# Patient Record
Sex: FEMALE | Race: ASIAN | NOT HISPANIC OR LATINO | Employment: FULL TIME
[De-identification: names, ages, dates, MRNs, and addresses within clinical notes are randomized per-mention and may not be internally consistent; named-entity substitution may affect disease eponyms.]

---

## 2017-05-30 ENCOUNTER — RECORDS - HEALTHEAST (OUTPATIENT)
Dept: ADMINISTRATIVE | Facility: OTHER | Age: 22
End: 2017-05-30

## 2017-05-30 LAB
BKR LAB AP ABNORMAL BLEEDING: NO
BKR LAB AP BIRTH CONTROL/HORMONES: NORMAL
BKR LAB AP CERVICAL APPEARANCE: NORMAL
BKR LAB AP GYN ADEQUACY: NORMAL
BKR LAB AP GYN INTERPRETATION: NORMAL
BKR LAB AP HPV REFLEX: NO
BKR LAB AP LMP: NORMAL
BKR LAB AP PATIENT STATUS: NORMAL
BKR LAB AP PREVIOUS ABNORMAL: NORMAL
BKR LAB AP PREVIOUS NORMAL: 2016
HIGH RISK?: NO
PATH REPORT.COMMENTS IMP SPEC: NORMAL
RESULT FLAG (HE HISTORICAL CONVERSION): NORMAL

## 2019-03-13 ENCOUNTER — RECORDS - HEALTHEAST (OUTPATIENT)
Dept: LAB | Facility: CLINIC | Age: 24
End: 2019-03-13

## 2019-03-13 LAB — TSH SERPL DL<=0.005 MIU/L-ACNC: 3.92 UIU/ML (ref 0.3–5)

## 2019-03-18 ENCOUNTER — RECORDS - HEALTHEAST (OUTPATIENT)
Dept: LAB | Facility: CLINIC | Age: 24
End: 2019-03-18

## 2019-03-18 LAB — PROLACTIN SERPL-MCNC: 20.3 NG/ML (ref 0–20)

## 2021-07-22 ENCOUNTER — LAB REQUISITION (OUTPATIENT)
Dept: LAB | Facility: CLINIC | Age: 26
End: 2021-07-22
Payer: COMMERCIAL

## 2021-07-22 DIAGNOSIS — Z01.419 ENCOUNTER FOR GYNECOLOGICAL EXAMINATION (GENERAL) (ROUTINE) WITHOUT ABNORMAL FINDINGS: ICD-10-CM

## 2021-07-22 DIAGNOSIS — N91.2 AMENORRHEA, UNSPECIFIED: ICD-10-CM

## 2021-07-22 DIAGNOSIS — R35.0 FREQUENCY OF MICTURITION: ICD-10-CM

## 2021-07-22 LAB
PROLACTIN SERPL-MCNC: 12.8 NG/ML (ref 0–20)
TSH SERPL DL<=0.005 MIU/L-ACNC: 3.7 UIU/ML (ref 0.3–5)

## 2021-07-22 PROCEDURE — 87624 HPV HI-RISK TYP POOLED RSLT: CPT | Mod: ORL | Performed by: FAMILY MEDICINE

## 2021-07-22 PROCEDURE — 87086 URINE CULTURE/COLONY COUNT: CPT | Performed by: FAMILY MEDICINE

## 2021-07-22 PROCEDURE — G0123 SCREEN CERV/VAG THIN LAYER: HCPCS | Mod: ORL | Performed by: FAMILY MEDICINE

## 2021-07-22 PROCEDURE — 84146 ASSAY OF PROLACTIN: CPT | Performed by: FAMILY MEDICINE

## 2021-07-22 PROCEDURE — 84443 ASSAY THYROID STIM HORMONE: CPT | Performed by: FAMILY MEDICINE

## 2021-07-23 LAB — BACTERIA UR CULT: NO GROWTH

## 2021-07-27 LAB
HUMAN PAPILLOMA VIRUS 16 DNA: NEGATIVE
HUMAN PAPILLOMA VIRUS 18 DNA: NEGATIVE
HUMAN PAPILLOMA VIRUS FINAL DIAGNOSIS: NORMAL
HUMAN PAPILLOMA VIRUS OTHER HR: NEGATIVE

## 2021-07-30 LAB
BKR LAB AP GYN ADEQUACY: NORMAL
BKR LAB AP GYN INTERPRETATION: NORMAL
BKR LAB AP HPV REFLEX: NORMAL
BKR LAB AP LMP: NORMAL
BKR LAB AP PREVIOUS ABNL DX: NORMAL
BKR LAB AP PREVIOUS ABNORMAL: NORMAL
PATH REPORT.COMMENTS IMP SPEC: NORMAL
PATH REPORT.RELEVANT HX SPEC: NORMAL

## 2023-01-17 ENCOUNTER — HOSPITAL ENCOUNTER (EMERGENCY)
Facility: HOSPITAL | Age: 28
Discharge: HOME | End: 2023-01-17
Attending: EMERGENCY MEDICINE | Admitting: EMERGENCY MEDICINE
Payer: COMMERCIAL

## 2023-01-17 VITALS
TEMPERATURE: 97.8 F | WEIGHT: 198 LBS | BODY MASS INDEX: 33.8 KG/M2 | SYSTOLIC BLOOD PRESSURE: 140 MMHG | DIASTOLIC BLOOD PRESSURE: 104 MMHG | HEART RATE: 90 BPM | RESPIRATION RATE: 16 BRPM | OXYGEN SATURATION: 98 % | HEIGHT: 64 IN

## 2023-01-17 DIAGNOSIS — N30.01 ACUTE CYSTITIS WITH HEMATURIA: ICD-10-CM

## 2023-01-17 LAB
ALBUMIN UR-MCNC: 100 MG/DL
APPEARANCE UR: ABNORMAL
BACTERIA #/AREA URNS HPF: ABNORMAL /HPF
BILIRUB UR QL STRIP: NEGATIVE
COLOR UR AUTO: ABNORMAL
GLUCOSE UR STRIP-MCNC: NEGATIVE MG/DL
HGB UR QL STRIP: ABNORMAL
KETONES UR STRIP-MCNC: NEGATIVE MG/DL
LEUKOCYTE ESTERASE UR QL STRIP: ABNORMAL
NITRATE UR QL: NEGATIVE
PH UR STRIP: 6.5 [PH] (ref 5–7)
RBC URINE: 21 /HPF
SP GR UR STRIP: 1 (ref 1–1.03)
SQUAMOUS EPITHELIAL: 2 /HPF
UROBILINOGEN UR STRIP-MCNC: <2 MG/DL
WBC URINE: 147 /HPF

## 2023-01-17 PROCEDURE — 87086 URINE CULTURE/COLONY COUNT: CPT | Performed by: EMERGENCY MEDICINE

## 2023-01-17 PROCEDURE — 81001 URINALYSIS AUTO W/SCOPE: CPT | Performed by: STUDENT IN AN ORGANIZED HEALTH CARE EDUCATION/TRAINING PROGRAM

## 2023-01-17 PROCEDURE — 99283 EMERGENCY DEPT VISIT LOW MDM: CPT

## 2023-01-17 PROCEDURE — 81001 URINALYSIS AUTO W/SCOPE: CPT | Performed by: EMERGENCY MEDICINE

## 2023-01-17 RX ORDER — PHENAZOPYRIDINE HYDROCHLORIDE 200 MG/1
200 TABLET, FILM COATED ORAL 3 TIMES DAILY
Qty: 9 TABLET | Refills: 0 | Status: SHIPPED | OUTPATIENT
Start: 2023-01-17 | End: 2023-01-20

## 2023-01-17 RX ORDER — CEFDINIR 300 MG/1
300 CAPSULE ORAL 2 TIMES DAILY
Qty: 14 CAPSULE | Refills: 0 | Status: SHIPPED | OUTPATIENT
Start: 2023-01-17 | End: 2023-01-24

## 2023-01-17 NOTE — ED TRIAGE NOTES
Patient noticed blood in her urine starting this am--now is having painful urination--no flank pain.

## 2023-01-17 NOTE — ED PROVIDER NOTES
EMERGENCY DEPARTMENT ENCOUNTER      NAME: Rafat Rodrigues  AGE: 27 year old female  YOB: 1995  MRN: [id number]  EVALUATION DATE & TIME: No admission date for patient encounter.    PCP: No Ref-Primary, Physician    ED PROVIDER: Constanza Jennings MD    Chief Complaint   Patient presents with     Hematuria         FINAL IMPRESSION:  1. Acute cystitis with hematuria          ED COURSE & MEDICAL DECISION MAKING:    Pertinent Labs & Imaging studies reviewed. (See chart for details)  27 year old female otherwise healthy who presents to the Emergency Department for evaluation of 1 day of small-volume urinary frequency, urgency, dysuria and hematuria.  Exam is unremarkable without any abdominal tenderness, CVA tenderness and she does not have any fevers chills or systemic symptoms.  Overall favor simple cystitis.  Clinically no evidence of pyelonephritis, lateralizing symptoms to suspect a ureterolithiasis.  Urinalysis with leukocyte Estrace, white cells, bacteria.  Will treat with cefdinir, Pyridium for home.  Warning signs return to ED discussed.      ED Course as of 01/17/23 1247   Tue Jan 17, 2023   1215 UA with Microscopic reflex to Culture(!)  +UTI   1217 I met with the patient to gather history and to perform my initial exam. We discussed plans for the ED course, including diagnostic testing and treatment.      1232 I marked the patient ready for discharge.        Medical Decision Making    History:    Supplemental history from: Documented in chart, if applicable    External Record(s) reviewed: Documented in chart, if applicable.    Work Up:    Chart documentation includes differential considered and any EKGs or imaging independently interpreted by provider, where specified.    In additional to work up documented, I considered the following work up: Documented in chart, if applicable.    External consultation:    Discussion of management with another provider: Documented in chart, if applicable    Complicating  factors:    Care impacted by chronic illness: N/A    Care affected by social determinants of health: Access to Medical Care    Disposition considerations: Discharge. I prescribed additional prescription strength medication(s) as charted. N/A.        At the conclusion of the encounter I discussed the results of all of the tests and the disposition. The questions were answered. The patient or family acknowledged understanding and was agreeable with the care plan.      MEDICATIONS GIVEN IN THE EMERGENCY:  Medications - No data to display    NEW PRESCRIPTIONS STARTED AT TODAY'S ER VISIT  New Prescriptions    CEFDINIR (OMNICEF) 300 MG CAPSULE    Take 1 capsule (300 mg) by mouth 2 times daily for 7 days    PHENAZOPYRIDINE (PYRIDIUM) 200 MG TABLET    Take 1 tablet (200 mg) by mouth 3 times daily for 3 days          =================================================================    HPI    Patient information was obtained from: Patient     Use of Intrepreter: N/A        Rafat Rodrigues is a 27 year old female with a limited pertinent medical history who presents with hematuria.     Patient reports this morning she developed hematuria and dysuria. Notes her hematuria started as light pink but has progressively become darker in color. Endorses chills.     Denies back pain, flank pain, abdominal pain, fever, nausea, vomiting.       REVIEW OF SYSTEMS  Constitutional:  Denies fever, chills, weight loss or weakness  GI:  Denies abdominal pain, nausea, vomiting, diarrhea  : Endorses dysuria, hematuria. Denies flank pain.   Musculoskeletal:  Denies any new muscle/joint pain, swelling or loss of function.      PAST MEDICAL HISTORY:  History reviewed. No pertinent past medical history.    PAST SURGICAL HISTORY:  History reviewed. No pertinent surgical history.    CURRENT MEDICATIONS:    Cannot display prior to admission medications because the patient has not been admitted in this contact.       ALLERGIES:  No Known Allergies    FAMILY  "HISTORY:  History reviewed. No pertinent family history.    SOCIAL HISTORY:  Social History     Tobacco Use     Smoking status: Never     Smokeless tobacco: Never   Substance Use Topics     Alcohol use: No     Drug use: No        VITALS:  Patient Vitals for the past 24 hrs:   BP Temp Temp src Pulse Resp SpO2 Height Weight   01/17/23 1155 (!) 140/104 97.8  F (36.6  C) Oral 90 16 98 % 1.626 m (5' 4\") 89.8 kg (198 lb)       PHYSICAL EXAM    General Appearance: Well-appearing, well-nourished, no acute distress   Head:  Normocephalic  Eyes:  conjunctiva/corneas clear  ENT:  membranes are moist without pallor  Cardio:  Regular rate and rhythm  Pulm:  No respiratory distress  Back:   No CVA tenderness, normal ROM  Abdomen:  Soft, non-tender, non distended,no rebound or guarding.  Extremities: Normal gait  Skin:  Skin warm, dry, no rashes  Neuro:  Alert and oriented ×3     RADIOLOGY/LABS:  Reviewed all pertinent imaging. Please see official radiology report. All pertinent labs reviewed and interpreted.    Results for orders placed or performed during the hospital encounter of 01/17/23   UA with Microscopic reflex to Culture    Specimen: Urine, Clean Catch   Result Value Ref Range    Color Urine Red (A) Colorless, Straw, Light Yellow, Yellow    Appearance Urine Turbid (A) Clear    Glucose Urine Negative Negative mg/dL    Bilirubin Urine Negative Negative    Ketones Urine Negative Negative mg/dL    Specific Gravity Urine 1.003 1.001 - 1.030    Blood Urine >1.0 mg/dL (A) Negative    pH Urine 6.5 5.0 - 7.0    Protein Albumin Urine 100 (A) Negative mg/dL    Urobilinogen Urine <2.0 <2.0 mg/dL    Nitrite Urine Negative Negative    Leukocyte Esterase Urine 500 Brenda/uL (A) Negative    Bacteria Urine Few (A) None Seen /HPF    RBC Urine 21 (H) <=2 /HPF    WBC Urine 147 (H) <=5 /HPF    Squamous Epithelials Urine 2 (H) <=1 /HPF       The creation of this record is based on the scribe s observations of the work being performed by Constanza" MD Dick and the provider s statements to them. It was created on her behalf by Linda Grijalva, a trained medical scribe. This document has been checked and approved by the attending provider.    Constanza Jennings MD  Emergency Medicine  [facility] EMERGENCY DEPARTMENT  [address] [place] [id number]  [phone]  Dept: [phone]       Constanza Jennings MD  01/17/23 1241

## 2023-01-19 LAB — BACTERIA UR CULT: NORMAL

## 2024-07-14 PROCEDURE — 99285 EMERGENCY DEPT VISIT HI MDM: CPT | Mod: 25

## 2024-07-14 ASSESSMENT — COLUMBIA-SUICIDE SEVERITY RATING SCALE - C-SSRS
6. HAVE YOU EVER DONE ANYTHING, STARTED TO DO ANYTHING, OR PREPARED TO DO ANYTHING TO END YOUR LIFE?: NO
2. HAVE YOU ACTUALLY HAD ANY THOUGHTS OF KILLING YOURSELF IN THE PAST MONTH?: NO
1. IN THE PAST MONTH, HAVE YOU WISHED YOU WERE DEAD OR WISHED YOU COULD GO TO SLEEP AND NOT WAKE UP?: NO

## 2024-07-15 ENCOUNTER — ANESTHESIA EVENT (OUTPATIENT)
Dept: SURGERY | Facility: CLINIC | Age: 29
End: 2024-07-15
Payer: COMMERCIAL

## 2024-07-15 ENCOUNTER — ANESTHESIA (OUTPATIENT)
Dept: SURGERY | Facility: CLINIC | Age: 29
End: 2024-07-15
Payer: COMMERCIAL

## 2024-07-15 ENCOUNTER — HOSPITAL ENCOUNTER (OUTPATIENT)
Facility: CLINIC | Age: 29
Setting detail: OBSERVATION
Discharge: HOME | End: 2024-07-15
Attending: EMERGENCY MEDICINE | Admitting: FAMILY MEDICINE
Payer: COMMERCIAL

## 2024-07-15 VITALS
TEMPERATURE: 97.3 F | DIASTOLIC BLOOD PRESSURE: 67 MMHG | WEIGHT: 189 LBS | BODY MASS INDEX: 32.44 KG/M2 | SYSTOLIC BLOOD PRESSURE: 119 MMHG | HEART RATE: 92 BPM | RESPIRATION RATE: 13 BRPM | OXYGEN SATURATION: 95 %

## 2024-07-15 DIAGNOSIS — N30.01 ACUTE CYSTITIS WITH HEMATURIA: ICD-10-CM

## 2024-07-15 DIAGNOSIS — K35.30 ACUTE APPENDICITIS WITH LOCALIZED PERITONITIS, WITHOUT PERFORATION, ABSCESS, OR GANGRENE: ICD-10-CM

## 2024-07-15 DIAGNOSIS — G89.18 ACUTE POST-OPERATIVE PAIN: ICD-10-CM

## 2024-07-15 DIAGNOSIS — K35.200 ACUTE APPENDICITIS WITH GENERALIZED PERITONITIS WITHOUT GANGRENE, PERFORATION, OR ABSCESS: Primary | ICD-10-CM

## 2024-07-15 LAB
ANION GAP SERPL CALCULATED.3IONS-SCNC: 8 MMOL/L (ref 7–15)
BUN SERPL-MCNC: 7.7 MG/DL (ref 6–20)
CALCIUM SERPL-MCNC: 8.4 MG/DL (ref 8.6–10)
CHLORIDE SERPL-SCNC: 107 MMOL/L (ref 98–107)
CREAT SERPL-MCNC: 0.62 MG/DL (ref 0.51–0.95)
DEPRECATED HCO3 PLAS-SCNC: 23 MMOL/L (ref 22–29)
EGFRCR SERPLBLD CKD-EPI 2021: >90 ML/MIN/1.73M2
ERYTHROCYTE [DISTWIDTH] IN BLOOD BY AUTOMATED COUNT: 15 % (ref 10–15)
GLUCOSE SERPL-MCNC: 98 MG/DL (ref 70–99)
HCT VFR BLD AUTO: 33.7 % (ref 35–47)
HGB BLD-MCNC: 10.8 G/DL (ref 11.7–15.7)
MCH RBC QN AUTO: 25.7 PG (ref 26.5–33)
MCHC RBC AUTO-ENTMCNC: 32 G/DL (ref 31.5–36.5)
MCV RBC AUTO: 80 FL (ref 78–100)
PLATELET # BLD AUTO: 241 10E3/UL (ref 150–450)
POTASSIUM SERPL-SCNC: 3.8 MMOL/L (ref 3.4–5.3)
RBC # BLD AUTO: 4.21 10E6/UL (ref 3.8–5.2)
SODIUM SERPL-SCNC: 138 MMOL/L (ref 135–145)
WBC # BLD AUTO: 12.6 10E3/UL (ref 4–11)

## 2024-07-15 PROCEDURE — 999N000141 HC STATISTIC PRE-PROCEDURE NURSING ASSESSMENT: Performed by: SURGERY

## 2024-07-15 PROCEDURE — G0378 HOSPITAL OBSERVATION PER HR: HCPCS

## 2024-07-15 PROCEDURE — 88304 TISSUE EXAM BY PATHOLOGIST: CPT | Mod: TC | Performed by: SURGERY

## 2024-07-15 PROCEDURE — 250N000025 HC SEVOFLURANE, PER MIN: Performed by: SURGERY

## 2024-07-15 PROCEDURE — 88304 TISSUE EXAM BY PATHOLOGIST: CPT | Mod: 26 | Performed by: PATHOLOGY

## 2024-07-15 PROCEDURE — 44970 LAPAROSCOPY APPENDECTOMY: CPT | Performed by: SURGERY

## 2024-07-15 PROCEDURE — 36415 COLL VENOUS BLD VENIPUNCTURE: CPT

## 2024-07-15 PROCEDURE — 258N000003 HC RX IP 258 OP 636: Performed by: EMERGENCY MEDICINE

## 2024-07-15 PROCEDURE — 99222 1ST HOSP IP/OBS MODERATE 55: CPT | Mod: 57

## 2024-07-15 PROCEDURE — 85027 COMPLETE CBC AUTOMATED: CPT

## 2024-07-15 PROCEDURE — 250N000009 HC RX 250: Performed by: ANESTHESIOLOGY

## 2024-07-15 PROCEDURE — 250N000011 HC RX IP 250 OP 636: Performed by: EMERGENCY MEDICINE

## 2024-07-15 PROCEDURE — 250N000009 HC RX 250: Performed by: SURGERY

## 2024-07-15 PROCEDURE — 250N000013 HC RX MED GY IP 250 OP 250 PS 637: Performed by: SURGERY

## 2024-07-15 PROCEDURE — 258N000003 HC RX IP 258 OP 636: Performed by: NURSE ANESTHETIST, CERTIFIED REGISTERED

## 2024-07-15 PROCEDURE — 99222 1ST HOSP IP/OBS MODERATE 55: CPT | Mod: FS | Performed by: SURGERY

## 2024-07-15 PROCEDURE — 272N000001 HC OR GENERAL SUPPLY STERILE: Performed by: SURGERY

## 2024-07-15 PROCEDURE — 250N000009 HC RX 250: Performed by: NURSE ANESTHETIST, CERTIFIED REGISTERED

## 2024-07-15 PROCEDURE — 96366 THER/PROPH/DIAG IV INF ADDON: CPT

## 2024-07-15 PROCEDURE — 250N000011 HC RX IP 250 OP 636: Performed by: ANESTHESIOLOGY

## 2024-07-15 PROCEDURE — 710N000010 HC RECOVERY PHASE 1, LEVEL 2, PER MIN: Performed by: SURGERY

## 2024-07-15 PROCEDURE — 370N000017 HC ANESTHESIA TECHNICAL FEE, PER MIN: Performed by: SURGERY

## 2024-07-15 PROCEDURE — 250N000011 HC RX IP 250 OP 636

## 2024-07-15 PROCEDURE — 96365 THER/PROPH/DIAG IV INF INIT: CPT | Mod: 59

## 2024-07-15 PROCEDURE — 96375 TX/PRO/DX INJ NEW DRUG ADDON: CPT

## 2024-07-15 PROCEDURE — 250N000011 HC RX IP 250 OP 636: Performed by: SURGERY

## 2024-07-15 PROCEDURE — 44970 LAPAROSCOPY APPENDECTOMY: CPT | Mod: AS

## 2024-07-15 PROCEDURE — 360N000076 HC SURGERY LEVEL 3, PER MIN: Performed by: SURGERY

## 2024-07-15 PROCEDURE — 99222 1ST HOSP IP/OBS MODERATE 55: CPT | Mod: GC

## 2024-07-15 PROCEDURE — 710N000012 HC RECOVERY PHASE 2, PER MINUTE: Performed by: SURGERY

## 2024-07-15 PROCEDURE — 80048 BASIC METABOLIC PNL TOTAL CA: CPT

## 2024-07-15 PROCEDURE — 258N000003 HC RX IP 258 OP 636: Performed by: ANESTHESIOLOGY

## 2024-07-15 PROCEDURE — 250N000011 HC RX IP 250 OP 636: Performed by: NURSE ANESTHETIST, CERTIFIED REGISTERED

## 2024-07-15 RX ORDER — FENTANYL CITRATE 50 UG/ML
25-100 INJECTION, SOLUTION INTRAMUSCULAR; INTRAVENOUS
Status: DISCONTINUED | OUTPATIENT
Start: 2024-07-15 | End: 2024-07-15 | Stop reason: HOSPADM

## 2024-07-15 RX ORDER — ACETAMINOPHEN 325 MG/1
650 TABLET ORAL EVERY 4 HOURS PRN
Status: DISCONTINUED | OUTPATIENT
Start: 2024-07-15 | End: 2024-07-15 | Stop reason: HOSPADM

## 2024-07-15 RX ORDER — DEXAMETHASONE SODIUM PHOSPHATE 10 MG/ML
4 INJECTION, SOLUTION INTRAMUSCULAR; INTRAVENOUS
Status: DISCONTINUED | OUTPATIENT
Start: 2024-07-15 | End: 2024-07-15 | Stop reason: HOSPADM

## 2024-07-15 RX ORDER — POLYETHYLENE GLYCOL 3350 17 G/17G
17 POWDER, FOR SOLUTION ORAL DAILY
Status: DISCONTINUED | OUTPATIENT
Start: 2024-07-15 | End: 2024-07-15 | Stop reason: HOSPADM

## 2024-07-15 RX ORDER — CALCIUM CARBONATE 500 MG/1
1000 TABLET, CHEWABLE ORAL 4 TIMES DAILY PRN
Status: DISCONTINUED | OUTPATIENT
Start: 2024-07-15 | End: 2024-07-15 | Stop reason: HOSPADM

## 2024-07-15 RX ORDER — OXYCODONE HYDROCHLORIDE 5 MG/1
5 TABLET ORAL EVERY 4 HOURS PRN
Qty: 6 TABLET | Refills: 0 | Status: SHIPPED | OUTPATIENT
Start: 2024-07-15

## 2024-07-15 RX ORDER — PIPERACILLIN SODIUM, TAZOBACTAM SODIUM 3; .375 G/15ML; G/15ML
3.38 INJECTION, POWDER, LYOPHILIZED, FOR SOLUTION INTRAVENOUS ONCE
Status: COMPLETED | OUTPATIENT
Start: 2024-07-15 | End: 2024-07-15

## 2024-07-15 RX ORDER — PROPOFOL 10 MG/ML
INJECTION, EMULSION INTRAVENOUS PRN
Status: DISCONTINUED | OUTPATIENT
Start: 2024-07-15 | End: 2024-07-15

## 2024-07-15 RX ORDER — ONDANSETRON 2 MG/ML
4 INJECTION INTRAMUSCULAR; INTRAVENOUS EVERY 30 MIN PRN
Status: DISCONTINUED | OUTPATIENT
Start: 2024-07-15 | End: 2024-07-15 | Stop reason: HOSPADM

## 2024-07-15 RX ORDER — CEFAZOLIN SODIUM/WATER 2 G/20 ML
2 SYRINGE (ML) INTRAVENOUS
Status: COMPLETED | OUTPATIENT
Start: 2024-07-15 | End: 2024-07-15

## 2024-07-15 RX ORDER — SODIUM CHLORIDE, SODIUM LACTATE, POTASSIUM CHLORIDE, CALCIUM CHLORIDE 600; 310; 30; 20 MG/100ML; MG/100ML; MG/100ML; MG/100ML
INJECTION, SOLUTION INTRAVENOUS CONTINUOUS
Status: DISCONTINUED | OUTPATIENT
Start: 2024-07-15 | End: 2024-07-15 | Stop reason: HOSPADM

## 2024-07-15 RX ORDER — GLYCOPYRROLATE 0.2 MG/ML
INJECTION, SOLUTION INTRAMUSCULAR; INTRAVENOUS PRN
Status: DISCONTINUED | OUTPATIENT
Start: 2024-07-15 | End: 2024-07-15

## 2024-07-15 RX ORDER — PIPERACILLIN SODIUM, TAZOBACTAM SODIUM 3; .375 G/15ML; G/15ML
3.38 INJECTION, POWDER, LYOPHILIZED, FOR SOLUTION INTRAVENOUS EVERY 8 HOURS
Status: DISCONTINUED | OUTPATIENT
Start: 2024-07-15 | End: 2024-07-15 | Stop reason: HOSPADM

## 2024-07-15 RX ORDER — FENTANYL CITRATE 50 UG/ML
25 INJECTION, SOLUTION INTRAMUSCULAR; INTRAVENOUS EVERY 5 MIN PRN
Status: DISCONTINUED | OUTPATIENT
Start: 2024-07-15 | End: 2024-07-15 | Stop reason: HOSPADM

## 2024-07-15 RX ORDER — ONDANSETRON 4 MG/1
4 TABLET, ORALLY DISINTEGRATING ORAL EVERY 30 MIN PRN
Status: DISCONTINUED | OUTPATIENT
Start: 2024-07-15 | End: 2024-07-15 | Stop reason: HOSPADM

## 2024-07-15 RX ORDER — AMOXICILLIN 250 MG
2 CAPSULE ORAL 2 TIMES DAILY PRN
Status: DISCONTINUED | OUTPATIENT
Start: 2024-07-15 | End: 2024-07-15 | Stop reason: HOSPADM

## 2024-07-15 RX ORDER — KETAMINE HYDROCHLORIDE 10 MG/ML
INJECTION INTRAMUSCULAR; INTRAVENOUS PRN
Status: DISCONTINUED | OUTPATIENT
Start: 2024-07-15 | End: 2024-07-15

## 2024-07-15 RX ORDER — NALOXONE HYDROCHLORIDE 0.4 MG/ML
0.2 INJECTION, SOLUTION INTRAMUSCULAR; INTRAVENOUS; SUBCUTANEOUS
Status: DISCONTINUED | OUTPATIENT
Start: 2024-07-15 | End: 2024-07-15 | Stop reason: HOSPADM

## 2024-07-15 RX ORDER — DEXAMETHASONE SODIUM PHOSPHATE 10 MG/ML
INJECTION, SOLUTION INTRAMUSCULAR; INTRAVENOUS PRN
Status: DISCONTINUED | OUTPATIENT
Start: 2024-07-15 | End: 2024-07-15

## 2024-07-15 RX ORDER — HYDROMORPHONE HYDROCHLORIDE 1 MG/ML
0.2 INJECTION, SOLUTION INTRAMUSCULAR; INTRAVENOUS; SUBCUTANEOUS
Status: DISCONTINUED | OUTPATIENT
Start: 2024-07-15 | End: 2024-07-15 | Stop reason: HOSPADM

## 2024-07-15 RX ORDER — NALOXONE HYDROCHLORIDE 0.4 MG/ML
0.1 INJECTION, SOLUTION INTRAMUSCULAR; INTRAVENOUS; SUBCUTANEOUS
Status: DISCONTINUED | OUTPATIENT
Start: 2024-07-15 | End: 2024-07-15 | Stop reason: HOSPADM

## 2024-07-15 RX ORDER — OXYCODONE HYDROCHLORIDE 5 MG/1
5 TABLET ORAL
Status: DISCONTINUED | OUTPATIENT
Start: 2024-07-15 | End: 2024-07-15 | Stop reason: HOSPADM

## 2024-07-15 RX ORDER — ACETAMINOPHEN 650 MG/1
650 SUPPOSITORY RECTAL EVERY 4 HOURS PRN
Status: DISCONTINUED | OUTPATIENT
Start: 2024-07-15 | End: 2024-07-15 | Stop reason: HOSPADM

## 2024-07-15 RX ORDER — HYDROMORPHONE HCL IN WATER/PF 6 MG/30 ML
0.4 PATIENT CONTROLLED ANALGESIA SYRINGE INTRAVENOUS EVERY 5 MIN PRN
Status: DISCONTINUED | OUTPATIENT
Start: 2024-07-15 | End: 2024-07-15 | Stop reason: HOSPADM

## 2024-07-15 RX ORDER — NALOXONE HYDROCHLORIDE 0.4 MG/ML
0.4 INJECTION, SOLUTION INTRAMUSCULAR; INTRAVENOUS; SUBCUTANEOUS
Status: DISCONTINUED | OUTPATIENT
Start: 2024-07-15 | End: 2024-07-15 | Stop reason: HOSPADM

## 2024-07-15 RX ORDER — FENTANYL CITRATE 50 UG/ML
50 INJECTION, SOLUTION INTRAMUSCULAR; INTRAVENOUS EVERY 5 MIN PRN
Status: DISCONTINUED | OUTPATIENT
Start: 2024-07-15 | End: 2024-07-15 | Stop reason: HOSPADM

## 2024-07-15 RX ORDER — AMOXICILLIN 250 MG
1 CAPSULE ORAL 2 TIMES DAILY PRN
Status: DISCONTINUED | OUTPATIENT
Start: 2024-07-15 | End: 2024-07-15 | Stop reason: HOSPADM

## 2024-07-15 RX ORDER — ONDANSETRON 2 MG/ML
4 INJECTION INTRAMUSCULAR; INTRAVENOUS EVERY 6 HOURS PRN
Status: DISCONTINUED | OUTPATIENT
Start: 2024-07-15 | End: 2024-07-15 | Stop reason: HOSPADM

## 2024-07-15 RX ORDER — IPRATROPIUM BROMIDE AND ALBUTEROL SULFATE 2.5; .5 MG/3ML; MG/3ML
3 SOLUTION RESPIRATORY (INHALATION) ONCE
Status: COMPLETED | OUTPATIENT
Start: 2024-07-15 | End: 2024-07-15

## 2024-07-15 RX ORDER — SODIUM CHLORIDE, SODIUM LACTATE, POTASSIUM CHLORIDE, CALCIUM CHLORIDE 600; 310; 30; 20 MG/100ML; MG/100ML; MG/100ML; MG/100ML
INJECTION, SOLUTION INTRAVENOUS CONTINUOUS
Status: DISCONTINUED | OUTPATIENT
Start: 2024-07-15 | End: 2024-07-15

## 2024-07-15 RX ORDER — HYDROMORPHONE HYDROCHLORIDE 1 MG/ML
0.5 INJECTION, SOLUTION INTRAMUSCULAR; INTRAVENOUS; SUBCUTANEOUS EVERY 30 MIN PRN
Status: DISCONTINUED | OUTPATIENT
Start: 2024-07-15 | End: 2024-07-15 | Stop reason: HOSPADM

## 2024-07-15 RX ORDER — ONDANSETRON 2 MG/ML
INJECTION INTRAMUSCULAR; INTRAVENOUS PRN
Status: DISCONTINUED | OUTPATIENT
Start: 2024-07-15 | End: 2024-07-15

## 2024-07-15 RX ORDER — SODIUM CHLORIDE 9 MG/ML
INJECTION, SOLUTION INTRAVENOUS CONTINUOUS
Status: DISCONTINUED | OUTPATIENT
Start: 2024-07-15 | End: 2024-07-15 | Stop reason: HOSPADM

## 2024-07-15 RX ORDER — LIDOCAINE 40 MG/G
CREAM TOPICAL
Status: DISCONTINUED | OUTPATIENT
Start: 2024-07-15 | End: 2024-07-15 | Stop reason: HOSPADM

## 2024-07-15 RX ORDER — LIDOCAINE HYDROCHLORIDE 10 MG/ML
INJECTION, SOLUTION INFILTRATION; PERINEURAL PRN
Status: DISCONTINUED | OUTPATIENT
Start: 2024-07-15 | End: 2024-07-15

## 2024-07-15 RX ORDER — BUPIVACAINE HYDROCHLORIDE AND EPINEPHRINE 2.5; 5 MG/ML; UG/ML
INJECTION, SOLUTION EPIDURAL; INFILTRATION; INTRACAUDAL; PERINEURAL PRN
Status: DISCONTINUED | OUTPATIENT
Start: 2024-07-15 | End: 2024-07-15 | Stop reason: HOSPADM

## 2024-07-15 RX ORDER — ACETAMINOPHEN 325 MG/1
975 TABLET ORAL ONCE
Status: COMPLETED | OUTPATIENT
Start: 2024-07-15 | End: 2024-07-15

## 2024-07-15 RX ORDER — PROPOFOL 10 MG/ML
INJECTION, EMULSION INTRAVENOUS CONTINUOUS PRN
Status: DISCONTINUED | OUTPATIENT
Start: 2024-07-15 | End: 2024-07-15

## 2024-07-15 RX ORDER — SCOLOPAMINE TRANSDERMAL SYSTEM 1 MG/1
1 PATCH, EXTENDED RELEASE TRANSDERMAL ONCE
Status: DISCONTINUED | OUTPATIENT
Start: 2024-07-15 | End: 2024-07-15 | Stop reason: HOSPADM

## 2024-07-15 RX ORDER — HYDROMORPHONE HCL IN WATER/PF 6 MG/30 ML
0.2 PATIENT CONTROLLED ANALGESIA SYRINGE INTRAVENOUS EVERY 5 MIN PRN
Status: DISCONTINUED | OUTPATIENT
Start: 2024-07-15 | End: 2024-07-15 | Stop reason: HOSPADM

## 2024-07-15 RX ORDER — DEXAMETHASONE SODIUM PHOSPHATE 4 MG/ML
4 INJECTION, SOLUTION INTRA-ARTICULAR; INTRALESIONAL; INTRAMUSCULAR; INTRAVENOUS; SOFT TISSUE
Status: DISCONTINUED | OUTPATIENT
Start: 2024-07-15 | End: 2024-07-15 | Stop reason: HOSPADM

## 2024-07-15 RX ORDER — OXYCODONE HYDROCHLORIDE 5 MG/1
10 TABLET ORAL
Status: DISCONTINUED | OUTPATIENT
Start: 2024-07-15 | End: 2024-07-15 | Stop reason: HOSPADM

## 2024-07-15 RX ORDER — DEXMEDETOMIDINE HYDROCHLORIDE 4 UG/ML
INJECTION, SOLUTION INTRAVENOUS
Status: DISCONTINUED
Start: 2024-07-15 | End: 2024-07-15 | Stop reason: HOSPADM

## 2024-07-15 RX ORDER — ONDANSETRON 4 MG/1
4 TABLET, ORALLY DISINTEGRATING ORAL EVERY 6 HOURS PRN
Status: DISCONTINUED | OUTPATIENT
Start: 2024-07-15 | End: 2024-07-15 | Stop reason: HOSPADM

## 2024-07-15 RX ORDER — HYDROMORPHONE HYDROCHLORIDE 1 MG/ML
0.4 INJECTION, SOLUTION INTRAMUSCULAR; INTRAVENOUS; SUBCUTANEOUS
Status: DISCONTINUED | OUTPATIENT
Start: 2024-07-15 | End: 2024-07-15 | Stop reason: HOSPADM

## 2024-07-15 RX ADMIN — DEXMEDETOMIDINE HYDROCHLORIDE 20 MCG: 100 INJECTION, SOLUTION INTRAVENOUS at 13:00

## 2024-07-15 RX ADMIN — KETAMINE HYDROCHLORIDE 50 MG: 10 INJECTION INTRAMUSCULAR; INTRAVENOUS at 13:08

## 2024-07-15 RX ADMIN — DEXAMETHASONE SODIUM PHOSPHATE 10 MG: 10 INJECTION, SOLUTION INTRAMUSCULAR; INTRAVENOUS at 12:55

## 2024-07-15 RX ADMIN — PROPOFOL 200 MG: 10 INJECTION, EMULSION INTRAVENOUS at 12:50

## 2024-07-15 RX ADMIN — HYDROMORPHONE HYDROCHLORIDE 0.5 MG: 1 INJECTION, SOLUTION INTRAMUSCULAR; INTRAVENOUS; SUBCUTANEOUS at 00:39

## 2024-07-15 RX ADMIN — Medication 2 G: at 12:42

## 2024-07-15 RX ADMIN — PROPOFOL 150 MCG/KG/MIN: 10 INJECTION, EMULSION INTRAVENOUS at 12:55

## 2024-07-15 RX ADMIN — LIDOCAINE HYDROCHLORIDE 50 MG: 10 INJECTION, SOLUTION INFILTRATION; PERINEURAL at 12:50

## 2024-07-15 RX ADMIN — SCOPOLAMINE 1 PATCH: 1 SYSTEM TRANSDERMAL at 12:05

## 2024-07-15 RX ADMIN — PIPERACILLIN AND TAZOBACTAM 3.38 G: 3; .375 INJECTION, POWDER, FOR SOLUTION INTRAVENOUS at 06:30

## 2024-07-15 RX ADMIN — HYDROMORPHONE HYDROCHLORIDE 0.5 MG: 1 INJECTION, SOLUTION INTRAMUSCULAR; INTRAVENOUS; SUBCUTANEOUS at 13:17

## 2024-07-15 RX ADMIN — ONDANSETRON 4 MG: 2 INJECTION INTRAMUSCULAR; INTRAVENOUS at 13:21

## 2024-07-15 RX ADMIN — ONDANSETRON 4 MG: 2 INJECTION INTRAMUSCULAR; INTRAVENOUS at 00:39

## 2024-07-15 RX ADMIN — PIPERACILLIN AND TAZOBACTAM 3.38 G: 3; .375 INJECTION, POWDER, FOR SOLUTION INTRAVENOUS at 00:49

## 2024-07-15 RX ADMIN — FENTANYL CITRATE 100 MCG: 50 INJECTION INTRAMUSCULAR; INTRAVENOUS at 12:50

## 2024-07-15 RX ADMIN — ONDANSETRON 4 MG: 2 INJECTION INTRAMUSCULAR; INTRAVENOUS at 15:33

## 2024-07-15 RX ADMIN — SODIUM CHLORIDE: 9 INJECTION, SOLUTION INTRAVENOUS at 00:49

## 2024-07-15 RX ADMIN — ACETAMINOPHEN 975 MG: 325 TABLET ORAL at 11:18

## 2024-07-15 RX ADMIN — GLYCOPYRROLATE 0.2 MG: 0.2 INJECTION INTRAMUSCULAR; INTRAVENOUS at 13:06

## 2024-07-15 RX ADMIN — MIDAZOLAM 2 MG: 1 INJECTION INTRAMUSCULAR; INTRAVENOUS at 12:42

## 2024-07-15 RX ADMIN — IPRATROPIUM BROMIDE AND ALBUTEROL SULFATE 3 ML: .5; 3 SOLUTION RESPIRATORY (INHALATION) at 14:56

## 2024-07-15 RX ADMIN — SODIUM CHLORIDE, POTASSIUM CHLORIDE, SODIUM LACTATE AND CALCIUM CHLORIDE: 600; 310; 30; 20 INJECTION, SOLUTION INTRAVENOUS at 12:32

## 2024-07-15 RX ADMIN — ROCURONIUM BROMIDE 50 MG: 10 INJECTION, SOLUTION INTRAVENOUS at 12:55

## 2024-07-15 RX ADMIN — SUGAMMADEX 200 MG: 100 INJECTION, SOLUTION INTRAVENOUS at 13:36

## 2024-07-15 ASSESSMENT — ACTIVITIES OF DAILY LIVING (ADL)
ADLS_ACUITY_SCORE: 20
ADLS_ACUITY_SCORE: 35
ADLS_ACUITY_SCORE: 20
ADLS_ACUITY_SCORE: 35
ADLS_ACUITY_SCORE: 20

## 2024-07-15 ASSESSMENT — ENCOUNTER SYMPTOMS
ABDOMINAL PAIN: 1
DYSURIA: 1
HEMATURIA: 1

## 2024-07-15 NOTE — PROVIDER NOTIFICATION
"Dr. Tam notified patient states she feels like she \"can't breathe all the way out\". MDA at bedside.  "

## 2024-07-15 NOTE — CONSULTS
General Surgery Consultation  Rafat Rodrigues MRN# [id number]   Age/Sex: 28 year old female YOB: 1995     Reason for consult: 1. Acute appendicitis with generalized peritonitis without gangrene, perforation, or abscess    2. Acute appendicitis with localized peritonitis, without perforation, abscess, or gangrene    3. Acute cystitis with hematuria            Requesting physician: Dr. Villaseñor                  Assessment and Plan:   Assessment:  Rafat Rodrigues is a 28-year-old English speaking female generally healthy no past surgical history presenting with lower abdominal pain of 1 day to  with CT imaging demonstrating likely early acute appendicitis, right ovarian cyst.  Patient with 2x UTI in recent past currently urine culture negative, Hx intermittent acute sharp pains unknown correlation with menses, LMP June 2024. Patient afebrile with stable vitals leukocytosis of 12.6.    Plan:  -N.p.o.  Activity as tolerated   -Continue antibiotics  -plan for laparoscopic appendectomy later today with potential for subsequent discharge pending patient course  -Medical management per primary team          Chief Complaint:     Chief Complaint   Patient presents with    Abdominal Pain        History is obtained from the patient    HPI:   Rafat Rodrigues is a 28 year old female who presents with lower abdominal pain which occurred acutely as of 1 day prior where she presented to urgent care.  Patient also had an episode of diarrhea, denies any blood in the stool.  States she had a string of blood in her urine, cloudy, some mild burning as well.  Her lower abdominal pain is better today.  Denies nausea or vomiting, fever or chills.    Patient states she has had 2 prior UTIs in the past months and stated she thought she had another UTI, she states she saw a resolution of most of her symptoms after being treated for the UTIs.  But noticed she had cloudy urine again and with the lower abdominal pain presented to      Patient states  she does have a history of very acute, stabbing, intermittent pain of either side on her lower abdomen.  Patient states she is not sure how to correlate this with her menstrual cycle.  Her menstrual cycle is regular and her last one was 6/2024.  She usually has them at the first of the month or the end of the month.    Patient generally healthy and past no past medical history no surgical history prior.  Does not take blood thinners or aspirin.  Last meal was yesterday.         Past Medical History:   No past medical history on file.           Past Surgical History:   History reviewed. No pertinent surgical history.          Social History:    reports that she has never smoked. She has never used smokeless tobacco. She reports that she does not drink alcohol and does not use drugs.           Family History:   No family history on file.           Allergies:   No Known Allergies           Medications:     Prior to Admission medications    Not on File              Review of Systems:   The Review of Systems is negative other than noted in the HPI            Physical Exam:   Patient Vitals for the past 24 hrs:   BP Temp Temp src Pulse Resp SpO2 Weight   07/15/24 0735 124/73 97.8  F (36.6  C) Oral 62 16 98 % --   07/15/24 0258 113/75 97.6  F (36.4  C) Oral 66 -- 98 % --   07/15/24 0230 131/86 -- -- 70 -- 99 % --   07/15/24 0222 131/86 -- -- 71 15 100 % 85.7 kg (189 lb)   07/14/24 2325 132/76 97.6  F (36.4  C) Temporal 72 18 96 % --          Intake/Output Summary (Last 24 hours) at 7/15/2024 0955  Last data filed at 7/15/2024 0123  Gross per 24 hour   Intake 100 ml   Output --   Net 100 ml      Constitutional:   Awake, alert, cooperative, no apparent distress, and appears stated age       Eyes:   PERRL, conjunctiva/corneas clear, EOM's intact; no scleral edema or icterus noted        ENT:   Normocephalic, without obvious abnormality, atraumatic, Lips, mucosa, and tongue normal        Lungs:   Normal respiratory effort, no  "accessory muscle use       Cardiovascular:   Regular rate and rhythm       Abdomen:   Generally soft and may be mildly distended abdomen.  Mild tenderness with palpation over the right lower quadrant and midline inferior to the umbilicus.  Equivocal McBurney's point tenderness.  Negative Rovsing's.  Negative for peritoneal signs or guarding on exam.  Generally nontender with palpation over the upper quadrants.  No prior surgical supplies visualized on exam.       Musculoskeletal:   No obvious swelling, bruising or deformity       Skin:   Skin color and texture normal for patient, no rashes or lesions              Data:         All imaging studies reviewed by me.    Results for orders placed or performed during the hospital encounter of 07/15/24 (from the past 24 hour(s))   CBC with platelets   Result Value Ref Range    WBC Count 12.6 (H) 4.0 - 11.0 10e3/uL    RBC Count 4.21 3.80 - 5.20 10e6/uL    Hemoglobin 10.8 (L) 11.7 - 15.7 g/dL    Hematocrit 33.7 (L) 35.0 - 47.0 %    MCV 80 78 - 100 fL    MCH 25.7 (L) 26.5 - 33.0 pg    MCHC 32.0 31.5 - 36.5 g/dL    RDW 15.0 10.0 - 15.0 %    Platelet Count 241 150 - 450 10e3/uL   Basic metabolic panel   Result Value Ref Range    Sodium 138 135 - 145 mmol/L    Potassium 3.8 3.4 - 5.3 mmol/L    Chloride 107 98 - 107 mmol/L    Carbon Dioxide (CO2) 23 22 - 29 mmol/L    Anion Gap 8 7 - 15 mmol/L    Urea Nitrogen 7.7 6.0 - 20.0 mg/dL    Creatinine 0.62 0.51 - 0.95 mg/dL    GFR Estimate >90 >60 mL/min/1.73m2    Calcium 8.4 (L) 8.6 - 10.0 mg/dL    Glucose 98 70 - 99 mg/dL   POC US Guidance Needle Placement    Narrative    Ultrasound was performed as guidance to an anesthesia procedure.  Click   \"PACS images\" hyperlink below to view any stored images.  For specific   procedure details, view procedure note authored by anesthesia.   POC US Guidance Needle Placement    Narrative    Ultrasound was performed as guidance to an anesthesia procedure.  Click   \"PACS images\" hyperlink below to " view any stored images.  For specific   procedure details, view procedure note authored by anesthesia.           Chelsea Stout PA-C  [facility]  [address] [place] [id number]

## 2024-07-15 NOTE — ED NOTES
Expected Patient Referral to ED  10:15 PM    Referring Clinic/Provider:  [facility]    Reason for referral/Clinical facts:  Nonruptured appendicitis, otherwise healthy patient    Recommendations provided:  Send to ED for further evaluation -discussed that our general surgeon has been an emergent case and will likely continue to be in the OR most of the night and that we would suggest they potentially reach out to another hospital for more expedited care renal.  PA stated that the patient prefers [facility] and would likely still come here.    Caller was informed that this institution does possess the capabilities and/or resources to provide for patient and should be transferred to our facility.    Discussed that if direct admit is sought and any hurdles are encountered, this ED would be happy to see the patient and evaluate.    Informed caller that recommendations provided are recommendations based only on the facts provided and that they responsible to accept or reject the advice, or to seek a formal in person consultation as needed and that this ED will see/treat patient should they arrive.      Danae Mari MD  [facility] EMERGENCY ROOM  [address] [place] [phone] [phone]       Danae Mari MD  07/14/24 6741

## 2024-07-15 NOTE — ANESTHESIA PREPROCEDURE EVALUATION
"Anesthesia Pre-Procedure Evaluation    Patient: Rafat Rodrigues   MRN: [id number] : 1995        Procedure : Procedure(s):  APPENDECTOMY, LAPAROSCOPIC          No past medical history on file.   History reviewed. No pertinent surgical history.   No Known Allergies   Social History     Tobacco Use    Smoking status: Never    Smokeless tobacco: Never   Substance Use Topics    Alcohol use: No      Wt Readings from Last 1 Encounters:   07/15/24 85.7 kg (189 lb)        Anesthesia Evaluation            ROS/MED HX  ENT/Pulmonary:  - neg pulmonary ROS     Neurologic:  - neg neurologic ROS     Cardiovascular:  - neg cardiovascular ROS     METS/Exercise Tolerance:     Hematologic:     (+)      anemia,          Musculoskeletal:  - neg musculoskeletal ROS     GI/Hepatic: Comment: No nausea or vomiting      (+)         appendicitis,           Renal/Genitourinary:  - neg Renal ROS     Endo:  - neg endo ROS     Psychiatric/Substance Use:  - neg psychiatric ROS     Infectious Disease:  - neg infectious disease ROS     Malignancy:       Other:  - neg other ROS          Physical Exam    Airway  airway exam normal      Mallampati: III   TM distance: > 3 FB   Neck ROM: full   Mouth opening: < 3 cm    Respiratory Devices and Support         Dental       (+) Minor Abnormalities - some fillings, tiny chips      Cardiovascular   cardiovascular exam normal          Pulmonary   pulmonary exam normal                OUTSIDE LABS:  CBC:   Lab Results   Component Value Date    WBC 12.6 (H) 07/15/2024    HGB 10.8 (L) 07/15/2024    HCT 33.7 (L) 07/15/2024     07/15/2024     BMP:   Lab Results   Component Value Date     07/15/2024    POTASSIUM 3.8 07/15/2024    CHLORIDE 107 07/15/2024    CO2 23 07/15/2024    BUN 7.7 07/15/2024    CR 0.62 07/15/2024    GLC 98 07/15/2024     COAGS: No results found for: \"PTT\", \"INR\", \"FIBR\"  POC: No results found for: \"BGM\", \"HCG\", \"HCGS\"  HEPATIC: No results found for: \"ALBUMIN\", \"PROTTOTAL\", \"ALT\", " "\"AST\", \"GGT\", \"ALKPHOS\", \"BILITOTAL\", \"BILIDIRECT\", \"MAYURI\"  OTHER:   Lab Results   Component Value Date    ISELA 8.4 (L) 07/15/2024    TSH 3.70 07/22/2021       Anesthesia Plan    ASA Status:  2       Anesthesia Type: General.     - Airway: ETT      Maintenance: TIVA.        Consents    Anesthesia Plan(s) and associated risks, benefits, and realistic alternatives discussed. Questions answered and patient/representative(s) expressed understanding.     - Discussed: Risks, Benefits and Alternatives for BOTH SEDATION and the PROCEDURE were discussed     - Discussed with:  Patient            Postoperative Care    Pain management: Multi-modal analgesia.   PONV prophylaxis: Ondansetron (or other 5HT-3), Dexamethasone or Solumedrol, Scopolamine patch, Background Propofol Infusion     Comments:               HOMERO FALK MD    I have reviewed the pertinent notes and labs in the chart from the past 30 days and (re)examined the patient.  Any updates or changes from those notes are reflected in this note.                  "

## 2024-07-15 NOTE — ANESTHESIA CARE TRANSFER NOTE
Patient: Rafat Rodrigues    Procedure: Procedure(s):  APPENDECTOMY, LAPAROSCOPIC       Diagnosis: Acute appendicitis with generalized peritonitis without gangrene, perforation, or abscess [K35.200]  Diagnosis Additional Information: No value filed.    Anesthesia Type:   General     Note:    Oropharynx: oral airway in place    Oxygen Supplementation: face mask  Level of Supplemental Oxygen (L/min / FiO2): 8  Independent Airway: airway patency satisfactory and stable  Dentition: dentition unchanged  Vital Signs Stable: post-procedure vital signs reviewed and stable  Report to RN Given: handoff report given  Patient transferred to: PACU    Handoff Report: Identifed the Patient, Identified the Reponsible Provider, Reviewed the pertinent medical history, Discussed the surgical course, Reviewed Intra-OP anesthesia mangement and issues during anesthesia, Set expectations for post-procedure period and Allowed opportunity for questions and acknowledgement of understanding      Vitals:  Vitals Value Taken Time   /84 07/15/24 1403   Temp 36.4  C (97.6  F) 07/15/24 1401   Pulse 92 07/15/24 1404   Resp 23 07/15/24 1404   SpO2 100 % 07/15/24 1404   Vitals shown include unfiled device data.    Electronically Signed By: MANGO SINGLETON CRNA  July 15, 2024  2:06 PM

## 2024-07-15 NOTE — DISCHARGE INSTRUCTIONS
From your Surgeon:    Follow up:  For straightforward laparoscopic procedures, our practice is to check-in with you over the phone a few days after your procedure.  If you would like a scheduled follow up appointment in clinic, please call us at [phone] to schedule an appointment at your convenience.  If you would prefer to follow up with us further by phone, please let us know so that we may contact you 2-3 weeks following your procedure.        PAIN:  Some pain is expected after surgery. This will improve over time. After laparoscopic surgery, some people experience shoulder pain on the first day after surgery. This is from the gas used to inflate the abdomen during the surgery. This sensation usually improves within 48 hours. I recommend using Tylenol 1000 mg every 6 hours and ibuprofen 400-600 mg every 6 hours for your primary pain control. Alternate between the two medications, taking one every three hours. Example schedule below.    9 AM  - Tylenol 1000 mg   12 PM - Ibuprofen 400-600 mg  3 PM - Tylenol 1000 mg   6 PM - Ibuprofen 400-600 mg    Take these medications scheduled for 3 days. Do not exceed the maximum dosage amount over 24 hours as recommended on the medication bottle.   Take the prescribed narcotic medications only if needed in addition to medications above.     Diet: Regular diet. Patients can have difficulty with constipation following surgery, due in part to the administration of narcotic pain medications.  If you are suffering with constipation, you should avoid foods such as hard cheeses or red meat.  Foods high in fiber are recommended.      Activity: You should continue to be active at home, including getting up and walking frequently.  If possible, limit the amount of time spent in bed.    Restrictions: You should not lift greater than 20 pounds for 2 weeks, and will want to avoid strenuous physical activity for 1-2 weeks.  You should limit your physical activity if it causes you  discomfort; however, this should resolve within 1-2 weeks.   Walking does not count as strenuous physical activity and you are safe to walk up and down stairs.  Following 2 weeks you may resume normal physical activity.    Work:  You can return to work once your surgical pain has resolved.  If you perform duties that require lifting, pushing or pulling anything greater than 15 pounds, you should be on light duty for the immediate 2 weeks after your surgery (if your work allows light duty).  After 2 weeks, you can return to work without restrictions.    Wound care: Your wounds are covered with Dermabond which is a waterproof skin glue. This will peel off on its own after 14 days.  It is normal to have a small rim of red present around the incisions. This should not, however, extend beyond 1/4 inch from the incision.  If your incisions become increasingly tender, red, or draining, please contact us.       Bathing: You may shower after 24 hours from surgery.  It is ok to get your incisions wet, but avoid rubbing them.  Avoid soaking in bath tubs or swimming in lakes, pools, or streams for 2 weeks following surgery.

## 2024-07-15 NOTE — H&P
[facility]    History and Physical - Hospitalist Service       Date of Admission:  7/15/2024    Assessment & Plan      Rafat Rodrigues is a 28 year old female admitted on 7/15/2024. She presents with abdominal pain found to have non-perforated appendicitis.     Appendicitis  Abdominal pain   Presenting with acute abdominal pain with associated nausea found to have non-perforated, mildly thickened appendix up to 8-9 mm with surrounding stranding on CT imaging. Will plan for OR.   -admit to obs.   -NPO for anticipated surgery   -Zosyn q8hr  -mIVF at 100cc's an hour   -pain control with tylenol PRN    -IV dilaudid for break through pain   -zofran for nausea     Concern for UTI   Hematuria   Presented to outside UC for UTI symptoms: dysuria, urinary frequency, hematuria. UA positive for blood and moderate amount of leuk esterase. S/p 1 dose of ceftriaxone at urgent care. Should be adequate coverage for 24 hours.   -follow-up with urgent care for urine culture   -can start orals after lap appendectomy for discharge home            Diet: NPO per Anesthesia Guidelines for Procedure/Surgery Except for: Meds  DVT Prophylaxis: Pneumatic Compression Devices  Sweet Catheter: Not present  Fluids: NS mIVF at 100cc's an hour   Lines: None     Cardiac Monitoring: None  Code Status: Full Code    Disposition Plan      Expected Discharge Date: 07/16/2024                The patient's care was discussed with the Attending Physician, Dr. Dillan Carmona and will be seen by Dr. Rashawn Jones in the morning .      Ramy Villaseñor MD  Hospitalist Service  [facility]  Securely message with [employer] (more info)  Text page via [employer] Paging/Directory   ______________________________________________________________________    Chief Complaint   Abd pain     History is obtained from the patient and ED provider     History of Present Illness   Rafat Rodrigues is a 28 year old female who has no significant past  medical history and is admitted for appendicitis. Patient was seen at an [facility] today around 7pm for UTI symptoms.Outside labs pertinent for WBC of 16.1 with a left shift, and UA with blood and moderate amount of leuk esterase. Urgent care provided followed up with CT scan which revealed Mildly thickened appendix up to 8-9 mm with surrounding stranding. Remaining bowel negative. No obstruction. Patient received one dose of ceftriaxone at the urgent care and then was referred to  ED for further management.     ED Course:     On arrival, vitally stable. Gen surgery was contacted who advised to admit to observation for lap appendectomy. Will plan for OR either tonight or tomorrow morning. S/p 1 dose of dilaudid and started on zosyn in ED.    Rated pain an 8/10 at its worse, but mild at the moment. Some chills but no fevers.     Past Medical History    No past medical history on file.    Past Surgical History   No past surgical history on file.    Prior to Admission Medications   None          Physical Exam   Vital Signs: Temp: 97.6  F (36.4  C) Temp src: Temporal BP: 132/76 Pulse: 72   Resp: 18 SpO2: 96 % O2 Device: None (Room air)    Weight: 0 lbs 0 oz    Constitutional: awake, alert, cooperative, no apparent distress, and appears stated age  Respiratory: No increased work of breathing, good air exchange, clear to auscultation bilaterally, no crackles or wheezing  Cardiovascular: Normal apical impulse, regular rate and rhythm, normal S1 and S2, no S3 or S4, and no murmur noted  GI: BS present. Mild RLQ abdominal tenderness.   Musculoskeletal: no lower extremity pitting edema present      Data     Outside Labs:   CBC: WBC 16.1 with ANC of 12.5   UA: large amounts of occult blood; moderate amount of leuk esterase   Imaging results reviewed over the past 24 hrs:   Recent Results (from the past 24 hour(s))   CT Abdomen Pelvis w Contrast    Narrative    For Patients: As a result of the 21st Century Cures Act,  medical imaging exams and procedure reports are released immediately into your electronic medical record. You may view this report before your referring provider. If you have questions, please contact your health care provider.    EXAM: CT ABDOMEN PELVIS W  LOCATION: [facility] [place]  DATE: 7/14/2024    INDICATION: Hematuria. Dysuria. Abdominal pain.   COMPARISON: None.  TECHNIQUE: CT scan of the abdomen and pelvis was performed following injection of IV contrast. Multiplanar reformats were obtained. Dose reduction techniques were used.  CONTRAST: IOPAMIDOL 300 MG/ML  ML BOTTLE: 100mL    FINDINGS:   LOWER CHEST: Normal.    HEPATOBILIARY: Normal.    PANCREAS: Normal.    SPLEEN: Normal.    ADRENAL GLANDS: Normal.    KIDNEYS/BLADDER: Mild circumferential bladder wall thickening. Negative kidneys.    BOWEL: Mildly thickened appendix up to 8-9 mm with surrounding stranding. Remaining bowel negative. No obstruction.    LYMPH NODES: No adenopathy.    VASCULATURE: Nonaneurysmal aorta.    PELVIC ORGANS: Ovarian cysts up to 3.8 x 4.1 cm on the right (series 2, image 68).    MUSCULOSKELETAL: Normal.    Impression    1.  Findings of acute appendicitis. Mildly thickened appendix with surrounding stranding.    2.  Mild urinary bladder wall thickening; correlate with labs for additional cystitis.    3.  Negative kidneys.    4.  No free fluid or air. No abscess.

## 2024-07-15 NOTE — PHARMACY-ADMISSION MEDICATION HISTORY
Pharmacist Admission Medication History    Admission medication history is complete. The information provided in this note is only as accurate as the sources available at the time of the update.    Information Source(s): Patient via in-person    Pertinent Information:     Changes made to PTA medication list:  Added: None  Deleted: None  Changed: None    Allergies reviewed with patient and updates made in EHR: yes    Medication History Completed By: Izabel Ellison RPH 7/15/2024 8:02 AM    No outpatient medications have been marked as taking for the 7/15/24 encounter (Hospital Encounter).

## 2024-07-15 NOTE — PLAN OF CARE
"Goal Outcome Evaluation:    Arrived on floor at 0300. Vitally stable. Denies pain. Reports less pain with urination. Tender RLQ and LLQ. Ambulating independently.            Problem: Adult Inpatient Plan of Care  Goal: Patient-Specific Goal (Individualized)  Description: You can add care plan individualizations to a care plan. Examples of Individualization might be:  \"Parent requests to be called daily at 9am for status\", \"I have a hard time hearing out of my right ear\", or \"Do not touch me to wake me up as it startles  me\".  Outcome: Progressing     Problem: Adult Inpatient Plan of Care  Goal: Optimal Comfort and Wellbeing  Outcome: Progressing     Problem: Pain Acute  Goal: Optimal Pain Control and Function  Outcome: Progressing  Intervention: Prevent or Manage Pain  Recent Flowsheet Documentation  Taken 7/15/2024 0300 by Frances Collazo, RN  Medication Review/Management: medications reviewed              "

## 2024-07-15 NOTE — PROGRESS NOTES
[facility]    Progress Note - Hospitalist Service       Date of Admission:  7/15/2024    Assessment & Plan   Rafat Rodrigues is a 28 year old female admitted on 7/15/2024. She presents with abdominal pain found to have non-perforated appendicitis.        Appendicitis  Abdominal pain   Presenting with acute abdominal pain with associated nausea found to have non-perforated, mildly thickened appendix up to 8-9 mm with surrounding stranding on CT imaging. Will plan for OR.   -admit to obs.   -NPO for anticipated surgery   -Zosyn q8hr  -mIVF at 100cc's an hour   -pain control with tylenol PRN               -IV dilaudid for break through pain   -zofran for nausea        Concern for UTI   Hematuria   Presented to outside UC for UTI symptoms: dysuria, urinary frequency, hematuria. UA positive for blood and moderate amount of leuk esterase. Mild urinary bladder wall thickening on CT. S/p 1 dose of ceftriaxone at urgent care. Should be adequate coverage for 24 hours.   -follow-up with urgent care for urine culture   -can start orals after lap appendectomy for discharge home          Diet: NPO per Anesthesia Guidelines for Procedure/Surgery Except for: Meds    DVT Prophylaxis: Pneumatic Compression Devices  Sweet Catheter: Not present  Fluids: NS mIVF at 100cc's an hour  Lines: None     Cardiac Monitoring: None  Code Status: Full Code                    Disposition Plan      Expected Discharge Date: 07/16/2024                The patient's care was discussed with the Attending Physician, Dr. Jones who agreed with the plan .    Evonne De La Fuente MD PGY-1  Hospitalist Service  [facility]  Securely message with [employer] (more info)  Text page via [employer] Paging/Directory   ______________________________________________________________________    Interval History   None    Physical Exam   Vital Signs: Temp: 97.8  F (36.6  C) Temp src: Oral BP: 124/73 Pulse: 62   Resp: 16 SpO2: 98 % O2  Device: None (Room air)    Weight: 189 lbs 0 oz    Constitutional: awake, alert, cooperative, no apparent distress, and appears stated age  Eyes: vision intact  ENT: Normocephalic, without obvious abnormality, atraumatic, sinuses nontender on palpation, external ears without lesions, oral pharynx with moist mucous membranes, tonsils without erythema or exudates, gums normal and good dentition.  Hematologic / Lymphatic: no cervical lymphadenopathy  Respiratory: No increased work of breathing, good air exchange, clear to auscultation bilaterally, no crackles or wheezing  Cardiovascular: Normal apical impulse, regular rate and rhythm, normal S1 and S2, no S3 or S4, and no murmur noted  GI: normal bowel sounds, tenderness noted in the right lower quadrant, no masses palpated, and hernia absent  Genitounirinary: NAD  Skin: no bruising or bleeding  Musculoskeletal: no lower extremity pitting edema present  Neurologic: Awake, alert, oriented to name, place and time.  Cranial nerves II-XII are grossly intact.  Motor is 5 out of 5 bilaterally.  Cerebellar finger to nose, heel to shin intact.  Sensory is intact.  Babinski down going, Romberg negative, and gait is normal.  Neuropsychiatric: General: normal, calm, and normal eye contact        Please see A&P for additional details of medical decision making.      Data     I have personally reviewed the following data over the past 24 hrs:    12.6 (H)  \   10.8 (L)   / 241     138 107 7.7 /  98   3.8 23 0.62 \       Imaging results reviewed over the past 24 hrs:   Recent Results (from the past 24 hour(s))   CT Abdomen Pelvis w Contrast    Narrative    For Patients: As a result of the 21st Century Cures Act, medical imaging exams and procedure reports are released immediately into your electronic medical record. You may view this report before your referring provider. If you have questions, please contact your health care provider.    EXAM: CT ABDOMEN PELVIS W  LOCATION: [facility] [place]  DATE: 7/14/2024    INDICATION: Hematuria. Dysuria. Abdominal pain.   COMPARISON: None.  TECHNIQUE: CT scan of the abdomen and pelvis was performed following injection of IV contrast. Multiplanar reformats were obtained. Dose reduction techniques were used.  CONTRAST: IOPAMIDOL 300 MG/ML  ML BOTTLE: 100mL    FINDINGS:   LOWER CHEST: Normal.    HEPATOBILIARY: Normal.    PANCREAS: Normal.    SPLEEN: Normal.    ADRENAL GLANDS: Normal.    KIDNEYS/BLADDER: Mild circumferential bladder wall thickening. Negative kidneys.    BOWEL: Mildly thickened appendix up to 8-9 mm with surrounding stranding. Remaining bowel negative. No obstruction.    LYMPH NODES: No adenopathy.    VASCULATURE: Nonaneurysmal aorta.    PELVIC ORGANS: Ovarian cysts up to 3.8 x 4.1 cm on the right (series 2, image 68).    MUSCULOSKELETAL: Normal.    Impression    1.  Findings of acute appendicitis. Mildly thickened appendix with surrounding stranding.    2.  Mild urinary bladder wall thickening; correlate with labs for additional cystitis.    3.  Negative kidneys.    4.  No free fluid or air. No abscess.   POC US Guidance Needle Placement    Narrative    Ultrasound was performed as guidance to an anesthesia procedure.  Click   \"PACS images\" hyperlink below to view any stored images.  For specific   procedure details, view procedure note authored by anesthesia.   POC US Guidance Needle Placement    Narrative    Ultrasound was performed as guidance to an anesthesia procedure.  Click   \"PACS images\" hyperlink below to view any stored images.  For specific   procedure details, view procedure note authored by anesthesia.     "

## 2024-07-15 NOTE — OP NOTE
General Surgery Operative Note    Date of Surgery: 07/15/24     Surgeon: Lucien Valdes MD    Assistant: Chelsea Stout PA-C assistance was required for visualization retraction during the case    Preoperative Diagnosis: Acute appendicitis    Postoperative Diagnosis: Acute appendicitis    Procedure: Laparoscopic appendectomy    EBL: 30 cc    Findings: Inflamed appendix in the right lower quadrant    Indications:  Patient is a 28-year-old woman who presented to the hospital from urgent care with a diagnosis of acute appendicitis on CT scan.  After discussion of the risk and benefits and need for laparoscopic appendectomy, the patient consented to the procedure.    Details of operation:  The patient was brought to the operating room placed on the table in the supine position.  General anesthesia was induced without incident.  The patient was prepped and draped in the usual sterile fashion.  A timeout for safety was performed.    I began with a periumbilical incision and a Veress needle was introduced.  Pneumoperitoneum was established without incident.  A 5 mm optical port was placed at the site.  There was no injury with entry.  A 12 mm port was placed in the left lower quadrant and a 5 mm port placed in the suprapubic region.  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  I did take down some adhesions to the abdominal wall and was able to find the appendix.  The midportion was a inflamed.  Mesoappendix came up right next to the terminal ileum.  I carefully dissected this away.  I then felt it would be easier to start at the base of spine made a window between the appendix and the mesoappendix and fired a blue load Endo MEGAN stapler across the base.  I then use electrocautery staying very close to the appendix itself and was able to safely remove it from the mesoappendix without apparent injury to the  terminal ileum or cecum.  The appendix was placed in Endo Catch bag and removed from the abdomen.  The dissection area was inspected again for hemostasis.  I then closed the 12 mm port site fascia using an 0 Vicryl suture on a suture passer device.  The remaining ports were removed and insufflation was released.  The skin was closed with 4-0 Monocryl suture and skin glue was used as a dressing.  Patient tolerated the procedure well.  There were no immediately apparent complications.    Lucien Valdes MD  General Surgeon  [facility]  Surgery [address] [address]?  Office: [phone]

## 2024-07-15 NOTE — ED TRIAGE NOTES
Pt presents to the ED from the UR with c/o appendicitis. Pt states that she initially went to the UR for c/o UTI. Pt endorses lower abdominal pain, and intermittent sharp RLQ pains. Denies fevers, no N/V.      Triage Assessment (Adult)       Row Name 07/14/24 3322          Triage Assessment    Airway WDL WDL        Respiratory WDL    Respiratory WDL WDL        Skin Circulation/Temperature WDL    Skin Circulation/Temperature WDL WDL        Cardiac WDL    Cardiac WDL WDL        Peripheral/Neurovascular WDL    Peripheral Neurovascular WDL WDL        Cognitive/Neuro/Behavioral WDL    Cognitive/Neuro/Behavioral WDL WDL

## 2024-07-15 NOTE — ED PROVIDER NOTES
NAME: Rafat Rodrigues  AGE: 28 year old female  YOB: 1995  MRN: [id number]  EVALUATION DATE & TIME: 7/15/2024 12:05 AM    PCP: No Ref-Primary, Physician    ED PROVIDER: Bandar Braden M.D.      Chief Complaint   Patient presents with    Abdominal Pain     FINAL IMPRESSION:  1. Acute appendicitis with generalized peritonitis without gangrene, perforation, or abscess    2. Acute appendicitis with localized peritonitis, without perforation, abscess, or gangrene    3. Acute cystitis with hematuria      MEDICAL DECISION MAKIN:12 AM Patient was clinically assessed and consented to treatment. After assessment, medical decision making and workup were discussed with the patient. The patient was agreeable to plan for testing, workup, and treatment.  Pertinent Labs & Imaging studies reviewed. (See chart for details)  12:35 AM I spoke with Dr. Harmon from general surgery and patient recommended for appendectomy with admission to hospitalist.  12:49 AM I Spoke with Dr. Prieto, Hospitalist. We further discussed the patient's case, reviewed ED work-up so far, and agreed to admit the patient.       Medical Decision Making  Obtained supplemental history:Supplemental history obtained?: No  Reviewed external records: External records reviewed?: Documented in chart  Care impacted by chronic illness:N/A  Care significantly affected by social determinants of health:Access to Medical Care  Did you consider but not order tests?: Work up considered but not performed and documented in chart, if applicable  Did you interpret images independently?: Independent interpretation of ECG and images noted in documentation, when applicable.  Consultation discussion with other provider:Did you involve another provider (consultant, MH, pharmacy, etc.)?: I discussed the care with another health care provider, see documentation for details.  Admit.    Rafat Rodrigues is a 28 year old female who presents with abdominal pain.   Differential  diagnosis includes but not limited to appendicitis, ruptured appendicitis, cystitis, pyelonephritis.  Patient is a 28-year-old female ready seen at the urgent care with full labs including leukocytosis and also some diarrhea.  Patient thought to have possible UTI but then had CT scan which showed appendicitis as well as inflammation of bladder.  Patient likely with appendicitis and possible UTI.  Patient already received a dose of Rocephin for the UTI but needs coverage for gram-negative and will add Zosyn on.  Patient discussed with general surgery and recommended for appendectomy.  Patient will remain n.p.o., IV maintenance fluids and as needed pain control and antiemetics.  Patient discussed with resident service for admission until she goes to surgery later this morning.    0 minutes of critical care time    MEDICATIONS GIVEN IN THE EMERGENCY:  Medications   sodium chloride 0.9 % infusion ( Intravenous $New Bag 7/15/24 0049)   ondansetron (ZOFRAN) injection 4 mg (4 mg Intravenous $Given 7/15/24 0039)   HYDROmorphone (PF) (DILAUDID) injection 0.5 mg (0.5 mg Intravenous $Given 7/15/24 0039)   piperacillin-tazobactam (ZOSYN) 3.375 g vial to attach to  mL bag (3.375 g Intravenous $New Bag 7/15/24 0049)   lidocaine 1 % 0.1-1 mL (has no administration in time range)   lidocaine (LMX4) cream (has no administration in time range)   sodium chloride (PF) 0.9% PF flush 3 mL (has no administration in time range)   sodium chloride (PF) 0.9% PF flush 3 mL (has no administration in time range)   senna-docusate (SENOKOT-S/PERICOLACE) 8.6-50 MG per tablet 1 tablet (has no administration in time range)     Or   senna-docusate (SENOKOT-S/PERICOLACE) 8.6-50 MG per tablet 2 tablet (has no administration in time range)   calcium carbonate (TUMS) chewable tablet 1,000 mg (has no administration in time range)   acetaminophen (TYLENOL) tablet 650 mg (has no administration in time range)     Or   acetaminophen (TYLENOL) Suppository  650 mg (has no administration in time range)   HYDROmorphone (PF) (DILAUDID) injection 0.2 mg (has no administration in time range)   HYDROmorphone (PF) (DILAUDID) injection 0.4 mg (has no administration in time range)   polyethylene glycol (MIRALAX) Packet 17 g (has no administration in time range)   ondansetron (ZOFRAN ODT) ODT tab 4 mg (has no administration in time range)     Or   ondansetron (ZOFRAN) injection 4 mg (has no administration in time range)   piperacillin-tazobactam (ZOSYN) 3.375 g vial to attach to  mL bag (has no administration in time range)       NEW PRESCRIPTIONS STARTED AT TODAY'S ER VISIT:  New Prescriptions    No medications on file          =================================================================    HPI    Patient information was obtained from: The patient    Use of : N/A       Rafat Rodrigues is a 28 year old female with no past medical history, who presents with abdominal pain.    Per chart review, the patient was seen in the urgency room on 07/14/2024 for evaluation of urinary symptoms and lower abdominal pain. WBC count is elevated at 16.1. Her UA demonstrates evidence of infection along with hematuria. CT abdomen pelvis imaging was obtained for further evaluation. She received Rocephin and Tylenol. Patient will be transferred to the ER due to acute appendicitis.    The patient reports of hematuria and dysuria starting today. She also endorses lower abdominal pain. She was seen in urgent care earlier today and was transferred to the ER for findings of acute appendicitis on her CT scan. She received a dose of antibiotics for her UTI at urgent care. She states her pain is coming back at this time. No other medical complaints or concerns at this time.    REVIEW OF SYSTEMS   Review of Systems   Gastrointestinal:  Positive for abdominal pain (lower).   Genitourinary:  Positive for dysuria and hematuria.   All other systems reviewed and are negative.       PAST MEDICAL  HISTORY:  No past medical history on file.    PAST SURGICAL HISTORY:  No past surgical history on file.    CURRENT MEDICATIONS:      Current Facility-Administered Medications:     acetaminophen (TYLENOL) tablet 650 mg, 650 mg, Oral, Q4H PRN **OR** acetaminophen (TYLENOL) Suppository 650 mg, 650 mg, Rectal, Q4H PRN, Ramy Villaseñor MD    calcium carbonate (TUMS) chewable tablet 1,000 mg, 1,000 mg, Oral, 4x Daily PRN, Ramy Villaseñor MD    HYDROmorphone (PF) (DILAUDID) injection 0.2 mg, 0.2 mg, Intravenous, Q2H PRN, Ramy Villaseñor MD    HYDROmorphone (PF) (DILAUDID) injection 0.4 mg, 0.4 mg, Intravenous, Q2H PRN, Ramy Villaseñor MD    HYDROmorphone (PF) (DILAUDID) injection 0.5 mg, 0.5 mg, Intravenous, Q30 Min PRN, Bandar Braden MD, 0.5 mg at 07/15/24 0039    lidocaine (LMX4) cream, , Topical, Q1H PRN, Ramy Villaseñor MD    lidocaine 1 % 0.1-1 mL, 0.1-1 mL, Other, Q1H PRN, Ramy Villaseñor MD    ondansetron (ZOFRAN ODT) ODT tab 4 mg, 4 mg, Oral, Q6H PRN **OR** ondansetron (ZOFRAN) injection 4 mg, 4 mg, Intravenous, Q6H PRN, Ramy Villaseñor MD    ondansetron (ZOFRAN) injection 4 mg, 4 mg, Intravenous, Q30 Min PRN, Bandar Braden MD, 4 mg at 07/15/24 0039    piperacillin-tazobactam (ZOSYN) 3.375 g vial to attach to  mL bag, 3.375 g, Intravenous, Once, Bandar Braden MD, Last Rate: 200 mL/hr at 07/15/24 0049, 3.375 g at 07/15/24 0049    piperacillin-tazobactam (ZOSYN) 3.375 g vial to attach to  mL bag, 3.375 g, Intravenous, Q8H, Ramy Villaseñor MD    polyethylene glycol (MIRALAX) Packet 17 g, 17 g, Oral, Daily, Ramy Villaseñor MD    senna-docusate (SENOKOT-S/PERICOLACE) 8.6-50 MG per tablet 1 tablet, 1 tablet, Oral, BID PRN **OR** senna-docusate (SENOKOT-S/PERICOLACE) 8.6-50 MG per tablet 2 tablet, 2 tablet, Oral, BID PRN, Ramy Villaseñor MD    sodium chloride (PF) 0.9% PF flush 3 mL, 3 mL, Intracatheter, Q8H, Ramy Villaseñor MD    sodium chloride (PF) 0.9% PF flush 3 mL, 3 mL,  Intracatheter, q1 min prn, Ramy Villaseñor MD    sodium chloride 0.9 % infusion, , Intravenous, Continuous, WallBandar MD, Last Rate: 100 mL/hr at 07/15/24 0049, New Bag at 07/15/24 0049  No current outpatient medications on file.    ALLERGIES:  No Known Allergies    FAMILY HISTORY:  No family history on file.    SOCIAL HISTORY:   Social History     Socioeconomic History    Marital status:    Tobacco Use    Smoking status: Never    Smokeless tobacco: Never   Substance and Sexual Activity    Alcohol use: No    Drug use: No    Sexual activity: Yes     Partners: Male     PHYSICAL EXAM:    Vitals: /76   Pulse 72   Temp 97.6  F (36.4  C) (Temporal)   Resp 18   SpO2 96%    Physical Exam  Vitals and nursing note reviewed.   Constitutional:       General: She is not in acute distress.     Appearance: She is well-developed and normal weight.   HENT:      Head: Normocephalic.   Eyes:      General: No scleral icterus.  Cardiovascular:      Rate and Rhythm: Normal rate and regular rhythm.      Heart sounds: Normal heart sounds.   Pulmonary:      Effort: Pulmonary effort is normal. No respiratory distress.      Breath sounds: Normal breath sounds.   Abdominal:      General: Abdomen is flat.      Palpations: Abdomen is soft.      Tenderness: There is abdominal tenderness in the right lower quadrant. There is no right CVA tenderness, left CVA tenderness, guarding or rebound.      Hernia: No hernia is present.   Skin:     General: Skin is warm and dry.      Coloration: Skin is not jaundiced.   Neurological:      General: No focal deficit present.      Mental Status: She is alert.   Psychiatric:         Behavior: Behavior normal.           LAB:  All pertinent labs reviewed and interpreted.  Labs Ordered and Resulted from Time of ED Arrival to Time of ED Departure - No data to display    RADIOLOGY:  No orders to display     PROCEDURES:   Procedures     I, Gamaliel Clark, am serving as a scribe to document  services personally performed by Dr. Bandar Braden  based on my observation and the provider's statements to me. I, Bandar Braden MD attest that Gamaliel Clark is acting in a scribe capacity, has observed my performance of the services and has documented them in accordance with my direction.    Bandar Braden M.D.  Emergency Medicine  [facility] Emergency Department       Bandar Braden MD  07/15/24 0108       Bandar Braden MD  07/15/24 0159

## 2024-07-15 NOTE — ANESTHESIA POSTPROCEDURE EVALUATION
Patient: Rafat Rodrigues    Procedure: Procedure(s):  APPENDECTOMY, LAPAROSCOPIC       Anesthesia Type:  General    Note:     Postop Pain Control: Uneventful            Sign Out: Well controlled pain   PONV: No   Neuro/Psych: Uneventful            Sign Out: Acceptable/Baseline neuro status   Airway/Respiratory: Uneventful            Sign Out: Acceptable/Baseline resp. status   CV/Hemodynamics: Uneventful            Sign Out: Acceptable CV status; No obvious hypovolemia; No obvious fluid overload   Other NRE: NONE   DID A NON-ROUTINE EVENT OCCUR?     Event details/Postop Comments:  Pt with subjective feeling of difficulty breathing (With expiration). Gave duoneb which improved her breathing.            Last vitals:  Vitals Value Taken Time   /83 07/15/24 1440   Temp 36.4  C (97.6  F) 07/15/24 1401   Pulse 80 07/15/24 1446   Resp 13 07/15/24 1446   SpO2 97 % 07/15/24 1446   Vitals shown include unfiled device data.    Electronically Signed By: HOMERO FALK MD  July 15, 2024  2:49 PM

## 2024-07-15 NOTE — DISCHARGE SUMMARY
[facility]  Discharge Summary - Medicine & Pediatrics       Date of Admission:  7/15/2024  Date of Discharge:  7/15/2024  Discharging Provider: Evonne De La Fuente, PGY-1 Dr Jones, Attending physician  Discharge Service: Hospitalist Service    Discharge Diagnoses   Appendicitis  Cystitis    Clinically Significant Risk Factors          Follow-ups Needed After Discharge   Follow-up Appointments     Follow-up and recommended labs and tests       Follow up with primary care provider, Physician No Ref-Primary, within 7   days to evaluate after surgery.  No follow up labs or test are needed.            Unresulted Labs Ordered in the Past 30 Days of this Admission       Date and Time Order Name Status Description    7/15/2024  1:21 PM Surgical Pathology Exam In process         These results will be followed up by surgeon.    Discharge Disposition   Discharged to home  Condition at discharge: Stable    Hospital Course   Rafat Rodrigues was admitted on 7/15/2024 for RLQ, leukocytosis, and Appendicitis.  The following problems were addressed during her hospitalization:    Appendicitis  Pt come in with diagnosis of appendicitis by CT from outside . Pt is admitted over night. Laparoscopic appendectomy was done with out complication and patient sent home on the same day.     Consultations This Hospital Stay   COLORECTAL SURGERY IP CONSULT  SURGERY GENERAL IP CONSULT    Code Status   Full Code       The patient was discussed with Dr. Karen De La Fuente MD    [facility]/PH[facility] II  [address] [address]  Phone: [phone]  Fax: [phone]  ______________________________________________________________________    Physical Exam   Vital Signs: Temp: 97.7  F (36.5  C) Temp src: Temporal BP: 118/68 Pulse: 94   Resp: 13 SpO2: 94 % O2 Device: None (Room air) Oxygen Delivery: 6 LPM  Weight: 189 lbs 0 oz  Exam was done prior to surgery  Constitutional:  awake, alert, cooperative, no apparent distress, and appears stated age  Respiratory: No increased work of breathing, good air exchange, clear to auscultation bilaterally, no crackles or wheezing  Cardiovascular: Normal apical impulse, regular rate and rhythm, normal S1 and S2, no S3 or S4, and no murmur noted  GI: BS present. Mild RLQ abdominal tenderness.   Musculoskeletal: no lower extremity pitting edema present               Primary Care Physician   Physician No Ref-Primary    Discharge Orders      When to call - Contact Surgeon Team    You may experience symptoms that require follow-up before your scheduled appointment. Contact your Surgeon Team if you are concerned about pain control, large amount of bleeding, blood clots, constipation, or if you experience signs of infection (fever, growing tenderness at the surgery site, a large amount of drainage, severe pain, foul-smelling drainage, redness or swelling.     When to call - Reach out to Urgent Care    If you are experiencing uncontrolled Nausea and Vomiting, uncontrolled pain, inability to urinate and uncomfortable, and in need of immediate care, and you are NOT able to reach your Surgeon Team, go to an Urgent Care clinic. Do NOT go to the Emergency Room unless you have shortness of breath, chest pain, or other signs of a medical emergency.     When to call - Reasons to Call 911    Call 911 immediately if you experience sudden-onset chest pain, arm weakness/numbness, slurred speech, or shortness of breath     Symptoms - Fever Management    A low grade fever can be expected after surgery. Your Provider many have prescribed an Opioid pain medication that also contains acetaminophen (TYLENOL) that may help with Fever management.  Do NOT take additional acetaminophen (TYLENOL) in combination with an Opioid/acetaminophen (TYLENOL) product. Read the labels on your Over The Counter (OTC) medications with care.     Symptoms - Reduced Urine Output    If it has been  greater than 8 hours since you have urinated despite drinking plenty of water, call your Surgeon Team.     No driving or operating machinery    Do NOT drive any vehicle or operate mechanical equipment for 24 hours following the end of your surgery.  Even though you may feel normal, your reactions may be affected by Anesthesia medication you received.     No Alcohol    Do NOT drink alcoholic beverages for 24 hours following your surgery and while taking pain medications.     Discharge Instructions - Comfort and Pain Management    You will be called by the surgery nurses in a few days to check on your progress. If you are doing well, you do not need to come to a follow up appointment, but you may schedule an appointment if you would like to be seen. Call Dr. Valdes's office at [phone] with any questions or concerns.     PAIN:  Some pain is expected after surgery. This will improve over time. After laparoscopic surgery, some people experience shoulder pain on the first day after surgery. This is from the gas used to blow up the abdomen. This sensation usually improves within 48 hours. I recommend use Tylenol 1000 mg every 6 hours and ibuprofen 400-600 mg every 6 hours for your primary pain control. Alternate between the two medications, taking one every three hours. Example schedule below.    9 AM  - Tylenol 1000 mg   12 PM - Ibuprofen 400-600 mg  3 PM - Tylenol 1000 mg   6 PM - Ibuprofen 400-600 mg    Take these medications scheduled for 3 days.   Take the prescribed narcotic medications only if needed in addition to medications above.     ACTIVITIES:  Resume normal activities as tolerated. Avoid heavy lifting for 4 weeks.     WOUND CARE:  Skin glue was used as a dressing. This will peel off on its own in 2-3 weeks. You may shower after surgery. No baths, hot tubs, or swimming for a total of 2 weeks.     DIET:  Return to your regular diet. You may want to start with light foods immediately after surgery as  "the anesthesia can cause some nausea.     Discharge Instructions - Rest    Rest and relax for the next 24 hours. Make arrangements to have someone stay with you overnight, and avoid hazardous and strenuous activities.  Do NOT make any important decisions for the next 24 hours.     Reason for your hospital stay    Appendicitis     Follow-up and recommended labs and tests     Follow up with primary care provider, Physician No Ref-Primary, within 7 days to evaluate after surgery.  No follow up labs or test are needed.     Activity    Your activity upon discharge: activity as tolerated     Diet    Follow this diet upon discharge: Regular adult diet       Significant Results and Procedures   Most Recent 3 CBC's:  Recent Labs   Lab Test 07/15/24  0556   WBC 12.6*   HGB 10.8*   MCV 80        Most Recent 3 BMP's:  Recent Labs   Lab Test 07/15/24  0556      POTASSIUM 3.8   CHLORIDE 107   CO2 23   BUN 7.7   CR 0.62   ANIONGAP 8   ISELA 8.4*   GLC 98   ,   Results for orders placed or performed during the hospital encounter of 07/15/24   POC US Guidance Needle Placement    Narrative    Ultrasound was performed as guidance to an anesthesia procedure.  Click   \"PACS images\" hyperlink below to view any stored images.  For specific   procedure details, view procedure note authored by anesthesia.   POC US Guidance Needle Placement    Narrative    Ultrasound was performed as guidance to an anesthesia procedure.  Click   \"PACS images\" hyperlink below to view any stored images.  For specific   procedure details, view procedure note authored by anesthesia.       Discharge Medications   Current Discharge Medication List        START taking these medications    Details   oxyCODONE (ROXICODONE) 5 MG tablet Take 1 tablet (5 mg) by mouth every 4 hours as needed for moderate to severe pain  Qty: 6 tablet, Refills: 0    Associated Diagnoses: Acute post-operative pain           Allergies   No Known Allergies  "

## 2024-07-15 NOTE — LETTER
[facility] BROWN/PHASE II  [address] [address]  Phone: [phone]  Fax: [phone]    July 15, 2024        Rafat Pearceua  [address]  [place] [address]          To whom it may concern:    RE: Rafat Rodrigues    When the patient returns to work, the following restrictions apply until 7/29, should not lift greater than 15 lbs, only light duty.      Please contact me for questions or concerns.      Sincerely,      Lucien Valdes MD

## 2024-07-15 NOTE — ANESTHESIA PROCEDURE NOTES
Airway         Procedure Start/Stop Times: 7/15/2024 12:54 PM  Staff -        Anesthesiologist:  Patience Tam MD       CRNA: Enid Vera APRN CRNA       Performed By: CRNA  Consent for Airway        Urgency: elective  Indications and Patient Condition       Indications for airway management: erica-procedural and airway protection       Induction type:intravenous       Mask difficulty assessment: 2 - vent by mask + OA or adjuvant +/- NMBA    Final Airway Details       Final airway type: endotracheal airway       Successful airway: ETT - single  Endotracheal Airway Details        ETT size (mm): 7.0       Cuffed: yes       Successful intubation technique: video laryngoscopy       VL Blade Size: Glidescope 3       Grade View of Cords: 1       Adjucts: stylet       Position: Right       Measured from: lips       Secured at (cm): 21       Bite block used: None    Post intubation assessment        Placement verified by: capnometry, equal breath sounds and chest rise        Number of attempts at approach: 1       Number of other approaches attempted: 0       Secured with: commercial tube solorzano and tape       Ease of procedure: easy       Dentition: Intact and Unchanged    Medication(s) Administered   Medication Administration Time: 7/15/2024 12:54 PM

## 2024-07-16 LAB
PATH REPORT.COMMENTS IMP SPEC: NORMAL
PATH REPORT.COMMENTS IMP SPEC: NORMAL
PATH REPORT.FINAL DX SPEC: NORMAL
PATH REPORT.GROSS SPEC: NORMAL
PATH REPORT.MICROSCOPIC SPEC OTHER STN: NORMAL
PATH REPORT.RELEVANT HX SPEC: NORMAL
PHOTO IMAGE: NORMAL

## 2024-07-18 ENCOUNTER — TELEPHONE (OUTPATIENT)
Dept: SURGERY | Facility: CLINIC | Age: 29
End: 2024-07-18
Payer: COMMERCIAL

## 2024-07-18 NOTE — TELEPHONE ENCOUNTER
[facility] Post-Op Phone Call                     Surgeon: Lucien Valdes MD    Surgery: Laparoscopic Appendectomy  Date of Surgery: 7/15/2024  Discharge Date: 7/15/2024    Date/Time Called:   Date: 7/18/2024 Time: 2:38 PM   Attempt: First    RN called patient to complete post-op call. No answer and left message for patient to call clinic for any questions or concerns regarding recovery.    Lorraine REES RN, BSN

## 2024-09-29 ENCOUNTER — HEALTH MAINTENANCE LETTER (OUTPATIENT)
Age: 29
End: 2024-09-29

## 2025-08-07 ENCOUNTER — TRANSFERRED RECORDS (OUTPATIENT)
Dept: HEALTH INFORMATION MANAGEMENT | Facility: CLINIC | Age: 30
End: 2025-08-07
Payer: COMMERCIAL

## 2025-08-07 LAB — HBA1C MFR BLD: 5.9 %

## 2025-08-08 ENCOUNTER — MEDICAL CORRESPONDENCE (OUTPATIENT)
Dept: HEALTH INFORMATION MANAGEMENT | Facility: CLINIC | Age: 30
End: 2025-08-08
Payer: COMMERCIAL

## 2025-08-11 ENCOUNTER — PATIENT OUTREACH (OUTPATIENT)
Dept: CARE COORDINATION | Facility: CLINIC | Age: 30
End: 2025-08-11
Payer: COMMERCIAL

## (undated) DEVICE — SU DERMABOND ADVANCED .7ML DNX12

## (undated) DEVICE — ENDO TROCAR SLEEVE KII Z-THREADED 05X100MM CTS02

## (undated) DEVICE — TUBING SMOKE EVAC PNEUMOCLEAR HIGH FLOW 0620050250

## (undated) DEVICE — NDL INSUFFLATION 13GA 120MM C2201

## (undated) DEVICE — GLOVE BIOGEL PI ULTRATOUCH G SZ 7.5 42175

## (undated) DEVICE — GLOVE BIOGEL PI INDICATOR 8.0 LF 41680

## (undated) DEVICE — ELECTRODE PATIENT RETURN ADULT L10 FT 2 PLATE CORD 0855C

## (undated) DEVICE — SOL WATER IRRIG 1000ML BOTTLE 2F7114

## (undated) DEVICE — CUSTOM PACK LAP CHOLE SBA5BLCHEA

## (undated) DEVICE — STPL ENDO RELOAD 45X3.5MM 6R45B

## (undated) DEVICE — ENDO TROCAR FIRST ENTRY KII FIOS Z-THRD 12X100MM CTF73

## (undated) DEVICE — PREP CHLORAPREP 26ML TINTED HI-LITE ORANGE 930815

## (undated) DEVICE — SUTURE MONOCRYL+ 4-0 PS-2 27IN MCP426H

## (undated) DEVICE — SUCTION MANIFOLD NEPTUNE 2 SYS 1 PORT 702-025-000

## (undated) DEVICE — ENDO TROCAR FIRST ENTRY KII FIOS Z-THRD 05X100MM CTF03

## (undated) DEVICE — SU VICRYL+ 0 27 UR6 VLT VCP603H

## (undated) DEVICE — SOL NACL 0.9% IRRIG 1000ML BOTTLE 2F7124

## (undated) DEVICE — DECANTER VIAL 2006S

## (undated) DEVICE — STPL ENDO LINEAR CUT ARTICULATING 45MM ATS45

## (undated) DEVICE — ENDO POUCH UNIV RETRIEVAL SYSTEM INZII 10MM CD001

## (undated) DEVICE — ESU CORD MONOPOLAR 10'  E0510

## (undated) RX ORDER — FENTANYL CITRATE 50 UG/ML
INJECTION, SOLUTION INTRAMUSCULAR; INTRAVENOUS
Status: DISPENSED
Start: 2024-07-15

## (undated) RX ORDER — GLYCOPYRROLATE 0.2 MG/ML
INJECTION, SOLUTION INTRAMUSCULAR; INTRAVENOUS
Status: DISPENSED
Start: 2024-07-15